# Patient Record
Sex: MALE | URBAN - METROPOLITAN AREA
[De-identification: names, ages, dates, MRNs, and addresses within clinical notes are randomized per-mention and may not be internally consistent; named-entity substitution may affect disease eponyms.]

---

## 2023-05-25 ENCOUNTER — APPOINTMENT (OUTPATIENT)
Dept: URBAN - METROPOLITAN AREA CLINIC 232 | Age: 53
Setting detail: DERMATOLOGY
End: 2023-05-25

## 2023-05-25 DIAGNOSIS — L08.9 LOCAL INFECTION OF THE SKIN AND SUBCUTANEOUS TISSUE, UNSPECIFIED: ICD-10-CM

## 2023-05-25 PROCEDURE — OTHER MEDICATION COUNSELING: OTHER

## 2023-05-25 PROCEDURE — OTHER PRESCRIPTION: OTHER

## 2023-05-25 PROCEDURE — OTHER ORDER TESTS: OTHER

## 2023-05-25 PROCEDURE — OTHER COUNSELING: OTHER

## 2023-05-25 PROCEDURE — OTHER TREATMENT REGIMEN: OTHER

## 2023-05-25 PROCEDURE — OTHER KOH PREP: OTHER

## 2023-05-25 PROCEDURE — 99202 OFFICE O/P NEW SF 15 MIN: CPT

## 2023-05-25 RX ORDER — MUPIROCIN 20 MG/G
OINTMENT TOPICAL
Qty: 22 | Refills: 1 | Status: ERX | COMMUNITY
Start: 2023-05-25

## 2023-05-25 RX ORDER — CEPHALEXIN 500 MG/1
TABLET ORAL BID
Qty: 21 | Refills: 0 | Status: ERX | COMMUNITY
Start: 2023-05-25

## 2023-05-25 ASSESSMENT — LOCATION ZONE DERM
LOCATION ZONE: TRUNK
LOCATION ZONE: ARM

## 2023-05-25 ASSESSMENT — LOCATION SIMPLE DESCRIPTION DERM
LOCATION SIMPLE: RIGHT FOREARM
LOCATION SIMPLE: LEFT LOWER BACK

## 2023-05-25 ASSESSMENT — LOCATION DETAILED DESCRIPTION DERM
LOCATION DETAILED: LEFT SUPERIOR MEDIAL MIDBACK
LOCATION DETAILED: RIGHT PROXIMAL DORSAL FOREARM

## 2023-05-25 NOTE — HPI: SKIN LESION
What Type Of Note Output Would You Prefer (Optional)?: Standard Output
How Severe Is Your Skin Lesion?: mild
Has Your Skin Lesion Been Treated?: not been treated
Is This A New Presentation, Or A Follow-Up?: Skin Lesion
Additional History: Lesion on the back has been there for two weeks and lesion on the arm has been present for about one week.

## 2023-05-25 NOTE — PROCEDURE: COUNSELING
Detail Level: Detailed
Patient Specific Counseling (Will Not Stick From Patient To Patient): Disc w/pt that he appears to have a bacterial infection which is likely Staph and will be treated empirically at this time. Today we did a culture swab for adjustment of Abx as needed and a KOH prep to r/o any invasive fungal organism (blasto, e.g.) which was fortunately negative.

## 2023-05-25 NOTE — PROCEDURE: TREATMENT REGIMEN
Detail Level: Zone
Initiate Treatment: mupirocin 2 % topical ointment \\nSig: Apply to affected area two times daily for one week.\\n\\ncephalexin 500 mg tablet BID\\nSig: Take one pill three times a day for 5 days.

## 2023-05-25 NOTE — PROCEDURE: MEDICATION COUNSELING
Xeldidierz Pregnancy And Lactation Text: This medication is Pregnancy Category D and is not considered safe during pregnancy.  The risk during breast feeding is also uncertain.

## 2023-05-25 NOTE — PROCEDURE: MEDICATION COUNSELING
Azopt 1% ophthalmic suspension:  to each affected eye   B-12 Resin 1000 mcg oral tablet: 1 tab(s) orally once a day  Centrum:  orally   clopidogrel 75 mg oral tablet: 1 tab(s) orally once a day  Co Q-10 100 mg oral capsule: 1 cap(s) orally once a day  ferrous sulfate (as elemental iron) 45 mg oral tablet, extended release: 1 tab(s) orally once a day  Fish Oil 1000 mg oral capsule:  orally once a day  furosemide 20 mg oral tablet: 0.5 tab(s) orally once a day   Lopressor: 25 milligram(s) orally once a day  losartan 50 mg oral tablet: 1 tab(s) orally once a day  Simbrinza 1%- 0.2% ophthalmic suspension: 1 drop(s) to each affected eye 2 times a day  VESIcare 10 mg oral tablet: 1 tab(s) orally once a day  Vitamin D3 1000 intl units oral tablet:  orally    Azopt 1% ophthalmic suspension:  to each affected eye   B-12 Resin 1000 mcg oral tablet: 1 tab(s) orally once a day  bisacodyl 5 mg oral delayed release tablet: 1 tab(s) orally every 12 hours, As needed, Constipation  Centrum:  orally   clopidogrel 75 mg oral tablet: 1 tab(s) orally once a day  Co Q-10 100 mg oral capsule: 1 cap(s) orally once a day  ferrous sulfate (as elemental iron) 45 mg oral tablet, extended release: 1 tab(s) orally once a day  Fish Oil 1000 mg oral capsule:  orally once a day  furosemide 20 mg oral tablet: 1 tab(s) orally once a day  Lopressor: 25 milligram(s) orally once a day  losartan 50 mg oral tablet: 1 tab(s) orally once a day  polyethylene glycol 3350 oral powder for reconstitution: 17 gram(s) orally once a day  senna leaf extract oral tablet: 2 tab(s) orally once a day (at bedtime)  Simbrinza 1%- 0.2% ophthalmic suspension: 1 drop(s) to each affected eye 2 times a day  VESIcare 10 mg oral tablet: 1 tab(s) orally once a day  Vitamin D3 1000 intl units oral tablet: 1 tab(s) orally once a day   Cephalexin Pregnancy And Lactation Text: This medication is Pregnancy Category B and considered safe during pregnancy.  It is also excreted in breast milk but can be used safely for shorter doses.